# Patient Record
Sex: MALE | Race: BLACK OR AFRICAN AMERICAN | Employment: PART TIME | URBAN - METROPOLITAN AREA
[De-identification: names, ages, dates, MRNs, and addresses within clinical notes are randomized per-mention and may not be internally consistent; named-entity substitution may affect disease eponyms.]

---

## 2022-06-02 PROBLEM — G47.30 SLEEP APNEA: Status: ACTIVE | Noted: 2022-06-02

## 2022-06-02 PROBLEM — M72.2 PLANTAR FASCIAL FIBROMATOSIS: Status: ACTIVE | Noted: 2022-06-02

## 2022-06-02 PROBLEM — M17.9 OSTEOARTHRITIS OF KNEE: Status: ACTIVE | Noted: 2022-06-02

## 2022-06-02 RX ORDER — CETIRIZINE HCL 10 MG
TABLET ORAL DAILY
COMMUNITY
End: 2022-06-21

## 2022-06-02 RX ORDER — FLUTICASONE FUROATE, UMECLIDINIUM BROMIDE AND VILANTEROL TRIFENATATE 100; 62.5; 25 UG/1; UG/1; UG/1
1 POWDER RESPIRATORY (INHALATION) DAILY
COMMUNITY
End: 2022-06-21

## 2022-06-02 RX ORDER — PANTOPRAZOLE SODIUM 40 MG/1
40 TABLET, DELAYED RELEASE ORAL DAILY
COMMUNITY
End: 2022-06-21

## 2022-06-02 RX ORDER — ONDANSETRON 4 MG/1
4 TABLET, ORALLY DISINTEGRATING ORAL
COMMUNITY
End: 2022-06-21

## 2022-06-02 RX ORDER — SILDENAFIL 50 MG/1
TABLET, FILM COATED ORAL AS NEEDED
COMMUNITY

## 2022-06-02 RX ORDER — TAMSULOSIN HYDROCHLORIDE 0.4 MG/1
0.4 CAPSULE ORAL DAILY
COMMUNITY
Start: 2022-05-02

## 2022-06-08 ENCOUNTER — OFFICE VISIT (OUTPATIENT)
Dept: GASTROENTEROLOGY | Age: 77
End: 2022-06-08
Payer: MEDICARE

## 2022-06-08 VITALS
BODY MASS INDEX: 29.32 KG/M2 | HEIGHT: 66 IN | WEIGHT: 182.4 LBS | DIASTOLIC BLOOD PRESSURE: 84 MMHG | TEMPERATURE: 97 F | HEART RATE: 79 BPM | SYSTOLIC BLOOD PRESSURE: 129 MMHG | OXYGEN SATURATION: 98 % | RESPIRATION RATE: 18 BRPM

## 2022-06-08 VITALS — BODY MASS INDEX: 27.89 KG/M2 | WEIGHT: 184 LBS | HEIGHT: 68 IN

## 2022-06-08 VITALS — HEIGHT: 68 IN

## 2022-06-08 DIAGNOSIS — K27.9 PEPTIC ULCER: ICD-10-CM

## 2022-06-08 DIAGNOSIS — K57.30 DIVERTICULAR DISEASE OF COLON: ICD-10-CM

## 2022-06-08 DIAGNOSIS — K92.0 GASTROINTESTINAL HEMORRHAGE WITH HEMATEMESIS: ICD-10-CM

## 2022-06-08 DIAGNOSIS — K21.00 GASTROESOPHAGEAL REFLUX DISEASE WITH ESOPHAGITIS WITHOUT HEMORRHAGE: ICD-10-CM

## 2022-06-08 DIAGNOSIS — K27.9 PEPTIC ULCER: Primary | ICD-10-CM

## 2022-06-08 PROCEDURE — 1101F PT FALLS ASSESS-DOCD LE1/YR: CPT | Performed by: INTERNAL MEDICINE

## 2022-06-08 PROCEDURE — 99204 OFFICE O/P NEW MOD 45 MIN: CPT | Performed by: INTERNAL MEDICINE

## 2022-06-08 PROCEDURE — G8427 DOCREV CUR MEDS BY ELIG CLIN: HCPCS | Performed by: INTERNAL MEDICINE

## 2022-06-08 PROCEDURE — G8417 CALC BMI ABV UP PARAM F/U: HCPCS | Performed by: INTERNAL MEDICINE

## 2022-06-08 PROCEDURE — 3017F COLORECTAL CA SCREEN DOC REV: CPT | Performed by: INTERNAL MEDICINE

## 2022-06-08 PROCEDURE — 1123F ACP DISCUSS/DSCN MKR DOCD: CPT | Performed by: INTERNAL MEDICINE

## 2022-06-08 PROCEDURE — G8536 NO DOC ELDER MAL SCRN: HCPCS | Performed by: INTERNAL MEDICINE

## 2022-06-08 PROCEDURE — G8510 SCR DEP NEG, NO PLAN REQD: HCPCS | Performed by: INTERNAL MEDICINE

## 2022-06-08 RX ORDER — TOLTERODINE 4 MG/1
4 CAPSULE, EXTENDED RELEASE ORAL DAILY
COMMUNITY
End: 2022-06-21

## 2022-06-08 RX ORDER — FESOTERODINE FUMARATE 8 MG/1
TABLET, FILM COATED, EXTENDED RELEASE ORAL DAILY
COMMUNITY
End: 2022-06-21

## 2022-06-08 RX ORDER — OXYCODONE AND ACETAMINOPHEN 5; 325 MG/1; MG/1
TABLET ORAL
COMMUNITY

## 2022-06-08 RX ORDER — IVERMECTIN 3 MG/1
TABLET ORAL ONCE
COMMUNITY
End: 2022-06-21

## 2022-06-08 RX ORDER — POLYETHYLENE GLYCOL 3350 17 G/17G
POWDER, FOR SOLUTION ORAL
Qty: 510 G | Refills: 0 | Status: SHIPPED | OUTPATIENT
Start: 2022-06-08 | End: 2022-06-21

## 2022-06-08 NOTE — PROGRESS NOTES
Chief Complaint   Patient presents with    New Patient     1. Have you been to the ER, urgent care clinic since your last visit? Hospitalized since your last visit? No    2. Have you seen or consulted any other health care providers outside of the [address] [place] [address] since your last visit? Include any pap smears or colon screening. No   Visit Vitals  /84 (BP 1 Location: Right arm, BP Patient Position: Sitting, BP Cuff Size: Adult)   Pulse 79   Temp 97 °F (36.1 °C) (Oral)   Resp 18   Ht 5' 6\" (1.676 m)   Wt 82.7 kg (182 lb 6.4 oz)   SpO2 98%   BMI 29.44 kg/m²     Patient seen in office by Dr Vicki Reyes, EGD and colonoscopy ordered. Patient chose June 21, 2022. Patient to arrive at 10 am for 11 am procedure. Instructions given, meds sent to pharmacy. Patient has [employer] and per [employer] no pre-auth required ref # [id number].

## 2022-06-10 NOTE — H&P (VIEW-ONLY)
Brittany Menjivar is a 77 y.o. male who presents today for the following:  Chief Complaint   Patient presents with    New Patient         Allergies   Allergen Reactions    Sulfa (Sulfonamide Antibiotics) Unknown (comments)       Current Outpatient Medications   Medication Sig    tolterodine ER (DETROL LA) 4 mg ER capsule Take 4 mg by mouth daily.  ivermectin (STROMECTOL) 3 mg tablet Take  by mouth once.  fesoterodine (Toviaz) 8 mg ER tablet Take  by mouth daily.  oxyCODONE-acetaminophen (PERCOCET) 5-325 mg per tablet Take  by mouth every four (4) hours as needed for Pain.  polyethylene glycol (MIRALAX) 17 gram/dose powder Use as directed  Indications: emptying of the bowel    tamsulosin (FLOMAX) 0.4 mg capsule Take 0.4 mg by mouth daily.  sildenafil citrate (VIAGRA) 50 mg tablet Take  by mouth as needed for Erectile Dysfunction.  pantoprazole (PROTONIX) 40 mg tablet Take 40 mg by mouth daily.  mirabegron ER (Myrbetriq) 50 mg ER tablet Take 50 mg by mouth daily.  cetirizine (ZYRTEC) 10 mg tablet Take  by mouth daily.  fluticasone-umeclidinium-vilanterol (Trelegy Ellipta) 100-62.5-25 mcg inhaler Take 1 Puff by inhalation daily. (Patient not taking: Reported on 6/8/2022)    ondansetron (ZOFRAN ODT) 4 mg disintegrating tablet Take 4 mg by mouth every eight (8) hours as needed for Nausea or Vomiting. (Patient not taking: Reported on 6/8/2022)     No current facility-administered medications for this visit.        Past Medical History:   Diagnosis Date    Heart disease     History of bleeding ulcers 04/04/2021    Hyperlipidemia     Kidney stones     Osteoarthritis of knee 6/2/2022    Peptic ulcer 2016    Plantar fascial fibromatosis 6/2/2022    Sleep apnea 6/2/2022       Past Surgical History:   Procedure Laterality Date    COLONOSCOPY  04/13/2016    repeat in 10 years     COLONOSCOPY,DIAGNOSTIC  2011    HX KNEE ARTHROSCOPY Left 10/2016    [place] EGD DELIVER THERMAL ENERGY SPHNCTR/CARDIA GERD  04/13/2016       Family History   Problem Relation Age of Onset    Kidney Disease Mother     Cancer Father     Pancreatic Cancer Father        Social History     Socioeconomic History    Marital status:      Spouse name: Not on file    Number of children: Not on file    Years of education: Not on file    Highest education level: Not on file   Occupational History    Not on file   Tobacco Use    Smoking status: Never Smoker    Smokeless tobacco: Never Used   Vaping Use    Vaping Use: Never used   Substance and Sexual Activity    Alcohol use: Yes     Alcohol/week: 1.0 standard drink     Types: 1 Shots of liquor per week     Comment: daily     Drug use: Never    Sexual activity: Not on file   Other Topics Concern    Not on file   Social History Narrative    Not on file     Social Determinants of Health     Financial Resource Strain:     Difficulty of Paying Living Expenses: Not on file   Food Insecurity:     Worried About Running Out of Food in the Last Year: Not on file    Pedro of Food in the Last Year: Not on file   Transportation Needs:     Lack of Transportation (Medical): Not on file    Lack of Transportation (Non-Medical):  Not on file   Physical Activity:     Days of Exercise per Week: Not on file    Minutes of Exercise per Session: Not on file   Stress:     Feeling of Stress : Not on file   Social Connections:     Frequency of Communication with Friends and Family: Not on file    Frequency of Social Gatherings with Friends and Family: Not on file    Attends [employer] Services: Not on file    Active Member of Clubs or Organizations: Not on file    Attends Club or Organization Meetings: Not on file    Marital Status: Not on file   Intimate Partner Violence:     Fear of Current or Ex-Partner: Not on file    Emotionally Abused: Not on file    Physically Abused: Not on file    Sexually Abused: Not on file   Housing Stability:     Unable to Pay for Housing in the Last Year: Not on file    Number of Places Lived in the Last Year: Not on file    Unstable Housing in the Last Year: Not on file         HPI  71-year-old male with history of peptic ulcer disease, sleep apnea, osteoarthritis, hyperlipidemia, and BPH who comes in for evaluation of his peptic ulcer disease history. Patient states 1-1 and half years ago after working in his yard he became diaphoretic and nauseated which led to an episode where he vomited blood. He went to [facility] where he was seen and discharged on pantoprazole. Patient states he still has increased GI noise she states it is constant. He does take a stool softener on a regular basis. He is eating well and has good appetite. Bowel movements are change back to brown but he now just has constipation. No gross GI bleeding recently. No black or tarry stools recently. Stable weight. He states his last colonoscopy was in 2016 by Dr. Ankit Duenas and was told he should have a repeated in 5 years. Review of Systems   Constitutional: Negative. HENT: Negative. Negative for nosebleeds. Eyes: Negative. Respiratory: Negative. Cardiovascular: Negative. Gastrointestinal: Positive for abdominal pain, blood in stool, constipation and heartburn. Negative for diarrhea, melena, nausea and vomiting. Genitourinary: Negative. Musculoskeletal: Positive for joint pain. Skin: Negative. Neurological: Negative. Endo/Heme/Allergies: Negative. Psychiatric/Behavioral: Negative. All other systems reviewed and are negative. Visit Vitals  /84 (BP 1 Location: Right arm, BP Patient Position: Sitting, BP Cuff Size: Adult)   Pulse 79   Temp 97 °F (36.1 °C) (Oral)   Resp 18   Ht 5' 6\" (1.676 m)   Wt 82.7 kg (182 lb 6.4 oz)   SpO2 98%   BMI 29.44 kg/m²     Physical Exam  Vitals and nursing note reviewed. Constitutional:       Appearance: Normal appearance. HENT:      Head: Normocephalic and atraumatic.       Nose: Nose normal.      Mouth/Throat:      Mouth: Mucous membranes are moist.      Pharynx: Oropharynx is clear. Eyes:      General: No scleral icterus. Extraocular Movements: Extraocular movements intact. Conjunctiva/sclera: Conjunctivae normal.      Pupils: Pupils are equal, round, and reactive to light. Cardiovascular:      Rate and Rhythm: Normal rate and regular rhythm. Heart sounds: Normal heart sounds. Pulmonary:      Effort: Pulmonary effort is normal.      Breath sounds: Normal breath sounds. Abdominal:      General: Bowel sounds are normal. There is no distension. Palpations: Abdomen is soft. There is no mass. Tenderness: There is abdominal tenderness. There is no right CVA tenderness, left CVA tenderness, guarding or rebound. Hernia: No hernia is present. Musculoskeletal:         General: Normal range of motion. Cervical back: Normal range of motion and neck supple. Skin:     General: Skin is warm and dry. Coloration: Skin is not jaundiced. Neurological:      General: No focal deficit present. Mental Status: He is alert and oriented to person, place, and time. Psychiatric:         Mood and Affect: Mood normal.         Behavior: Behavior normal.         Thought Content: Thought content normal.         Judgment: Judgment normal.            1. Peptic ulcer  We will schedule patient for an EGD to further evaluate. Patient should continue the pantoprazole 40 mg daily for now. - UPPER GI ENDOSCOPY,DIAGNOSIS; Future    2. Gastrointestinal hemorrhage with hematemesis  No recent blood loss noted  - COLONOSCOPY,DIAGNOSTIC; Future  - polyethylene glycol (MIRALAX) 17 gram/dose powder; Use as directed  Indications: emptying of the bowel  Dispense: 510 g; Refill: 0  - UPPER GI ENDOSCOPY,DIAGNOSIS; Future    3. Gastroesophageal reflux disease with esophagitis without hemorrhage      4.  Diverticular disease of colon

## 2022-06-10 NOTE — PROGRESS NOTES
Mar Hensley is a 77 y.o. male who presents today for the following:  Chief Complaint   Patient presents with    New Patient         Allergies   Allergen Reactions    Sulfa (Sulfonamide Antibiotics) Unknown (comments)       Current Outpatient Medications   Medication Sig    tolterodine ER (DETROL LA) 4 mg ER capsule Take 4 mg by mouth daily.  ivermectin (STROMECTOL) 3 mg tablet Take  by mouth once.  fesoterodine (Toviaz) 8 mg ER tablet Take  by mouth daily.  oxyCODONE-acetaminophen (PERCOCET) 5-325 mg per tablet Take  by mouth every four (4) hours as needed for Pain.  polyethylene glycol (MIRALAX) 17 gram/dose powder Use as directed  Indications: emptying of the bowel    tamsulosin (FLOMAX) 0.4 mg capsule Take 0.4 mg by mouth daily.  sildenafil citrate (VIAGRA) 50 mg tablet Take  by mouth as needed for Erectile Dysfunction.  pantoprazole (PROTONIX) 40 mg tablet Take 40 mg by mouth daily.  mirabegron ER (Myrbetriq) 50 mg ER tablet Take 50 mg by mouth daily.  cetirizine (ZYRTEC) 10 mg tablet Take  by mouth daily.  fluticasone-umeclidinium-vilanterol (Trelegy Ellipta) 100-62.5-25 mcg inhaler Take 1 Puff by inhalation daily. (Patient not taking: Reported on 6/8/2022)    ondansetron (ZOFRAN ODT) 4 mg disintegrating tablet Take 4 mg by mouth every eight (8) hours as needed for Nausea or Vomiting. (Patient not taking: Reported on 6/8/2022)     No current facility-administered medications for this visit.        Past Medical History:   Diagnosis Date    Heart disease     History of bleeding ulcers 04/04/2021    Hyperlipidemia     Kidney stones     Osteoarthritis of knee 6/2/2022    Peptic ulcer 2016    Plantar fascial fibromatosis 6/2/2022    Sleep apnea 6/2/2022       Past Surgical History:   Procedure Laterality Date    COLONOSCOPY  04/13/2016    repeat in 10 years     COLONOSCOPY,DIAGNOSTIC  2011    HX KNEE ARTHROSCOPY Left 10/2016    [place] EGD DELIVER THERMAL ENERGY SPHNCTR/CARDIA GERD  04/13/2016       Family History   Problem Relation Age of Onset    Kidney Disease Mother     Cancer Father     Pancreatic Cancer Father        Social History     Socioeconomic History    Marital status:      Spouse name: Not on file    Number of children: Not on file    Years of education: Not on file    Highest education level: Not on file   Occupational History    Not on file   Tobacco Use    Smoking status: Never Smoker    Smokeless tobacco: Never Used   Vaping Use    Vaping Use: Never used   Substance and Sexual Activity    Alcohol use: Yes     Alcohol/week: 1.0 standard drink     Types: 1 Shots of liquor per week     Comment: daily     Drug use: Never    Sexual activity: Not on file   Other Topics Concern    Not on file   Social History Narrative    Not on file     Social Determinants of Health     Financial Resource Strain:     Difficulty of Paying Living Expenses: Not on file   Food Insecurity:     Worried About Running Out of Food in the Last Year: Not on file    Pedro of Food in the Last Year: Not on file   Transportation Needs:     Lack of Transportation (Medical): Not on file    Lack of Transportation (Non-Medical):  Not on file   Physical Activity:     Days of Exercise per Week: Not on file    Minutes of Exercise per Session: Not on file   Stress:     Feeling of Stress : Not on file   Social Connections:     Frequency of Communication with Friends and Family: Not on file    Frequency of Social Gatherings with Friends and Family: Not on file    Attends [employer] Services: Not on file    Active Member of Clubs or Organizations: Not on file    Attends Club or Organization Meetings: Not on file    Marital Status: Not on file   Intimate Partner Violence:     Fear of Current or Ex-Partner: Not on file    Emotionally Abused: Not on file    Physically Abused: Not on file    Sexually Abused: Not on file   Housing Stability:     Unable to Pay for Housing in the Last Year: Not on file    Number of Places Lived in the Last Year: Not on file    Unstable Housing in the Last Year: Not on file         HPI  67-year-old male with history of peptic ulcer disease, sleep apnea, osteoarthritis, hyperlipidemia, and BPH who comes in for evaluation of his peptic ulcer disease history. Patient states 1-1 and half years ago after working in his yard he became diaphoretic and nauseated which led to an episode where he vomited blood. He went to [facility] where he was seen and discharged on pantoprazole. Patient states he still has increased GI noise she states it is constant. He does take a stool softener on a regular basis. He is eating well and has good appetite. Bowel movements are change back to brown but he now just has constipation. No gross GI bleeding recently. No black or tarry stools recently. Stable weight. He states his last colonoscopy was in 2016 by Dr. Gary Blair and was told he should have a repeated in 5 years. Review of Systems   Constitutional: Negative. HENT: Negative. Negative for nosebleeds. Eyes: Negative. Respiratory: Negative. Cardiovascular: Negative. Gastrointestinal: Positive for abdominal pain, blood in stool, constipation and heartburn. Negative for diarrhea, melena, nausea and vomiting. Genitourinary: Negative. Musculoskeletal: Positive for joint pain. Skin: Negative. Neurological: Negative. Endo/Heme/Allergies: Negative. Psychiatric/Behavioral: Negative. All other systems reviewed and are negative. Visit Vitals  /84 (BP 1 Location: Right arm, BP Patient Position: Sitting, BP Cuff Size: Adult)   Pulse 79   Temp 97 °F (36.1 °C) (Oral)   Resp 18   Ht 5' 6\" (1.676 m)   Wt 82.7 kg (182 lb 6.4 oz)   SpO2 98%   BMI 29.44 kg/m²     Physical Exam  Vitals and nursing note reviewed. Constitutional:       Appearance: Normal appearance. HENT:      Head: Normocephalic and atraumatic.       Nose: Nose normal.      Mouth/Throat:      Mouth: Mucous membranes are moist.      Pharynx: Oropharynx is clear. Eyes:      General: No scleral icterus. Extraocular Movements: Extraocular movements intact. Conjunctiva/sclera: Conjunctivae normal.      Pupils: Pupils are equal, round, and reactive to light. Cardiovascular:      Rate and Rhythm: Normal rate and regular rhythm. Heart sounds: Normal heart sounds. Pulmonary:      Effort: Pulmonary effort is normal.      Breath sounds: Normal breath sounds. Abdominal:      General: Bowel sounds are normal. There is no distension. Palpations: Abdomen is soft. There is no mass. Tenderness: There is abdominal tenderness. There is no right CVA tenderness, left CVA tenderness, guarding or rebound. Hernia: No hernia is present. Musculoskeletal:         General: Normal range of motion. Cervical back: Normal range of motion and neck supple. Skin:     General: Skin is warm and dry. Coloration: Skin is not jaundiced. Neurological:      General: No focal deficit present. Mental Status: He is alert and oriented to person, place, and time. Psychiatric:         Mood and Affect: Mood normal.         Behavior: Behavior normal.         Thought Content: Thought content normal.         Judgment: Judgment normal.            1. Peptic ulcer  We will schedule patient for an EGD to further evaluate. Patient should continue the pantoprazole 40 mg daily for now. - UPPER GI ENDOSCOPY,DIAGNOSIS; Future    2. Gastrointestinal hemorrhage with hematemesis  No recent blood loss noted  - COLONOSCOPY,DIAGNOSTIC; Future  - polyethylene glycol (MIRALAX) 17 gram/dose powder; Use as directed  Indications: emptying of the bowel  Dispense: 510 g; Refill: 0  - UPPER GI ENDOSCOPY,DIAGNOSIS; Future    3. Gastroesophageal reflux disease with esophagitis without hemorrhage      4.  Diverticular disease of colon

## 2022-06-21 ENCOUNTER — ANESTHESIA EVENT (OUTPATIENT)
Dept: ENDOSCOPY | Age: 77
End: 2022-06-21
Payer: MEDICARE

## 2022-06-21 ENCOUNTER — ANESTHESIA (OUTPATIENT)
Dept: ENDOSCOPY | Age: 77
End: 2022-06-21
Payer: MEDICARE

## 2022-06-21 ENCOUNTER — HOSPITAL ENCOUNTER (OUTPATIENT)
Age: 77
Setting detail: OUTPATIENT SURGERY
Discharge: HOME | End: 2022-06-21
Attending: INTERNAL MEDICINE | Admitting: INTERNAL MEDICINE
Payer: MEDICARE

## 2022-06-21 VITALS
DIASTOLIC BLOOD PRESSURE: 84 MMHG | OXYGEN SATURATION: 98 % | HEIGHT: 66 IN | HEART RATE: 68 BPM | SYSTOLIC BLOOD PRESSURE: 152 MMHG | RESPIRATION RATE: 18 BRPM | WEIGHT: 180 LBS | TEMPERATURE: 98.5 F | BODY MASS INDEX: 28.93 KG/M2

## 2022-06-21 DIAGNOSIS — K29.50 CHRONIC GASTRITIS WITHOUT BLEEDING, UNSPECIFIED GASTRITIS TYPE: Primary | ICD-10-CM

## 2022-06-21 PROCEDURE — 74011000258 HC RX REV CODE- 258: Performed by: NURSE ANESTHETIST, CERTIFIED REGISTERED

## 2022-06-21 PROCEDURE — 45378 DIAGNOSTIC COLONOSCOPY: CPT | Performed by: INTERNAL MEDICINE

## 2022-06-21 PROCEDURE — 43239 EGD BIOPSY SINGLE/MULTIPLE: CPT | Performed by: INTERNAL MEDICINE

## 2022-06-21 PROCEDURE — 74011000250 HC RX REV CODE- 250

## 2022-06-21 PROCEDURE — 88312 SPECIAL STAINS GROUP 1: CPT

## 2022-06-21 PROCEDURE — 74011250636 HC RX REV CODE- 250/636: Performed by: NURSE ANESTHETIST, CERTIFIED REGISTERED

## 2022-06-21 PROCEDURE — 76060000031 HC ANESTHESIA FIRST 0.5 HR: Performed by: INTERNAL MEDICINE

## 2022-06-21 PROCEDURE — 74011250636 HC RX REV CODE- 250/636

## 2022-06-21 PROCEDURE — 2709999900 HC NON-CHARGEABLE SUPPLY: Performed by: INTERNAL MEDICINE

## 2022-06-21 PROCEDURE — 76040000019: Performed by: INTERNAL MEDICINE

## 2022-06-21 PROCEDURE — 77030021593 HC FCPS BIOP ENDOSC BSC -A: Performed by: INTERNAL MEDICINE

## 2022-06-21 PROCEDURE — 74011250636 HC RX REV CODE- 250/636: Performed by: INTERNAL MEDICINE

## 2022-06-21 PROCEDURE — 74011000250 HC RX REV CODE- 250: Performed by: NURSE ANESTHETIST, CERTIFIED REGISTERED

## 2022-06-21 PROCEDURE — 88305 TISSUE EXAM BY PATHOLOGIST: CPT

## 2022-06-21 RX ORDER — SODIUM CHLORIDE 9 MG/ML
125 INJECTION, SOLUTION INTRAVENOUS CONTINUOUS
Status: DISCONTINUED | OUTPATIENT
Start: 2022-06-21 | End: 2022-06-21 | Stop reason: HOSPADM

## 2022-06-21 RX ORDER — SODIUM CHLORIDE 0.9 % (FLUSH) 0.9 %
5-40 SYRINGE (ML) INJECTION AS NEEDED
Status: DISCONTINUED | OUTPATIENT
Start: 2022-06-21 | End: 2022-06-21 | Stop reason: HOSPADM

## 2022-06-21 RX ORDER — SODIUM CHLORIDE 0.9 % (FLUSH) 0.9 %
5-40 SYRINGE (ML) INJECTION EVERY 8 HOURS
Status: DISCONTINUED | OUTPATIENT
Start: 2022-06-21 | End: 2022-06-21 | Stop reason: HOSPADM

## 2022-06-21 RX ORDER — GLYCOPYRROLATE 0.2 MG/ML
INJECTION INTRAMUSCULAR; INTRAVENOUS AS NEEDED
Status: DISCONTINUED | OUTPATIENT
Start: 2022-06-21 | End: 2022-06-21 | Stop reason: HOSPADM

## 2022-06-21 RX ORDER — FAMOTIDINE 40 MG/1
40 TABLET, FILM COATED ORAL DAILY
Qty: 30 TABLET | Refills: 3 | Status: SHIPPED | OUTPATIENT
Start: 2022-06-21

## 2022-06-21 RX ORDER — PROPOFOL 10 MG/ML
INJECTION, EMULSION INTRAVENOUS AS NEEDED
Status: DISCONTINUED | OUTPATIENT
Start: 2022-06-21 | End: 2022-06-21 | Stop reason: HOSPADM

## 2022-06-21 RX ORDER — SODIUM CHLORIDE 9 MG/ML
INJECTION, SOLUTION INTRAVENOUS
Status: DISCONTINUED | OUTPATIENT
Start: 2022-06-21 | End: 2022-06-21 | Stop reason: HOSPADM

## 2022-06-21 RX ADMIN — SODIUM CHLORIDE: 9 INJECTION, SOLUTION INTRAVENOUS at 11:48

## 2022-06-21 RX ADMIN — GLYCOPYRROLATE 0.2 MG: 0.2 INJECTION INTRAMUSCULAR; INTRAVENOUS at 11:48

## 2022-06-21 RX ADMIN — PROPOFOL 50 MG: 10 INJECTION, EMULSION INTRAVENOUS at 12:05

## 2022-06-21 RX ADMIN — PROPOFOL 50 MG: 10 INJECTION, EMULSION INTRAVENOUS at 12:09

## 2022-06-21 RX ADMIN — TOPICAL ANESTHETIC 1 SPRAY: 200 SPRAY DENTAL; PERIODONTAL at 11:48

## 2022-06-21 RX ADMIN — PROPOFOL 50 MG: 10 INJECTION, EMULSION INTRAVENOUS at 11:59

## 2022-06-21 NOTE — INTERVAL H&P NOTE
Update History & Physical    The Patient's History and Physical of June 21, 2022,  was reviewed with the patient and I examined the patient. There was no change. The surgical site was confirmed by the patient and me. Plan:  The risk, benefits, expected outcome, and alternative to the recommended procedure have been discussed with the patient. Patient understands and wants to proceed with the procedure.     Electronically signed by Shakir Bautista MD on 6/21/2022 at 10:23 AM

## 2022-06-21 NOTE — OP NOTES
EGD Procedure Note        Patient: Christina Landaverde MRN: [id number]  SSN: [id number]    YOB: 1945  Age: 77 y.o. Sex: male        Date/Time:  6/21/2022 12:24 PM         IMPRESSION:       1. Antral gastritis with superficial ulceration  2. Distal esophagitis (grade 1)       RECOMMENDATIONS:    1. Check biopsy results. 2. We will start patient on famotidine 40 mg daily. Procedure: Esophagogastroduodenoscopy with cold biopsies    Indication: GI bleeding    Endoscopist:  Abelino Segura MD    Referring Provider:   Pam Parks MD    History: The history and physical exam were reviewed and updated. Endoscope: GIF H180 Olympus video endoscope    Extent of Exam: Second part of the duodenum    ASA: Grade 2    Anethesia/Sedation:  TIVA    Description of the procedure: The procedure was discussed with the patient including risks, benefits, alternatives including risks of iv sedation, bleeding, perforation and aspiration. A safety timeout was performed. The patient was placed in the left lateral decubitus position. A bite block was placed. The patient was using standard protocol. The patients vital signs were monitored at all times including heart rate/rhythm, blood pressure and oxygen saturation. The endoscope was then passed under direct visualization to the second part of the duodenum. The endoscope was then slowly withdrawn while visualizing the mucosa. In the stomach a retroflexion was performed and gastric fundus and cardia visualized. The patient was then transferred to recovery in stable condition. Findings:   Esophagus: The esophageal mucosa was normal inflamed in the distal esophagus. .  Stomach: The gastric mucosa was inflamed throughout the gastric antrum with superficial ulcerations in the prepyloric region. Multiple biopsies were taken in the gastric antrum of this finding. .   Duodenum: The duodenum mucosa was normal with no ulceration, mass, stricture and no evidence of villous atrophy. Therapies:  None    Specimens:   ID Type Source Tests Collected by Time Destination   1 :  Preservative Stomach, Antrum  Tory Valdivia MD 6/21/2022 1202 Pathology     ASSISTANT:Circ-1: Suzette Velasquez  Scrub Tech-1: Molli Cogan           EBL:Minimal    Complications:   None; patient tolerated the procedure well.      Implants: None    Discharge disposition:  Out of the recovery area when discharge criteria met         Norm Shankar MD  June 21, 2022  12:24 PM

## 2022-06-21 NOTE — ANESTHESIA PREPROCEDURE EVALUATION
Relevant Problems   RESPIRATORY SYSTEM   (+) Sleep apnea      GASTROINTESTINAL   (+) Peptic ulcer       Anesthetic History   No history of anesthetic complications  Other anesthesia complications          Review of Systems / Medical History  Patient summary reviewed, nursing notes reviewed and pertinent labs reviewed    Pulmonary        Sleep apnea           Neuro/Psych   Within defined limits           Cardiovascular  Within defined limits                     GI/Hepatic/Renal           PUD     Endo/Other        Arthritis     Other Findings              Physical Exam    Airway  Mallampati: II  TM Distance: 4 - 6 cm  Neck ROM: normal range of motion        Cardiovascular    Rhythm: regular  Rate: normal         Dental  No notable dental hx       Pulmonary  Breath sounds clear to auscultation               Abdominal  Abdominal exam normal       Other Findings            Anesthetic Plan    ASA: 3  Anesthesia type: total IV anesthesia            Anesthetic plan and risks discussed with: Patient

## 2022-06-21 NOTE — DISCHARGE INSTRUCTIONS

## 2022-06-21 NOTE — ANESTHESIA POSTPROCEDURE EVALUATION
Procedure(s):  COLONOSCOPY, ESOPHAGOGASTRODUODENOSCOPY (EGD) (TIVA)  ESOPHAGOGASTRODUODENOSCOPY (EGD).     total IV anesthesia    Anesthesia Post Evaluation      Multimodal analgesia: multimodal analgesia not used between 6 hours prior to anesthesia start to PACU discharge  Patient location during evaluation: PACU  Patient participation: complete - patient participated  Pain management: adequate  Airway patency: patent  Anesthetic complications: no  Cardiovascular status: acceptable and stable  Respiratory status: acceptable and room air  Hydration status: acceptable  Post anesthesia nausea and vomiting:  none  Final Post Anesthesia Temperature Assessment:  Normothermia (36.0-37.5 degrees C)      INITIAL Post-op Vital signs:   Vitals Value Taken Time   /75 06/21/22 1219   Temp 36.9 °C (98.5 °F) 06/21/22 1219   Pulse 87 06/21/22 1219   Resp 87 06/21/22 1219   SpO2 97 % 06/21/22 1219

## 2022-06-21 NOTE — OP NOTES
Colonoscopy Procedure Note      Patient: Guy Pandya MRN: [id number]  SSN: [id number]    YOB: 1945  Age: 77 y.o. Sex: male      Date of Procedure: 6/21/2022  Date/Time:  6/21/2022 12:19 PM       IMPRESSION:     1. Left-sided diverticulosis         RECOMMENDATIONS:     1) Increase fiber in diet to keep stools soft. 2) Repeat colonoscopy in 5 years. INDICATION: GI bleeding     PROCEDURE PERFORMED: Colonoscopy     DESCRIPTION OF PROCEDURE: An informed consent was obtained. The patient was placed in left lateral position. Perianal inspection and a digital rectal exam was performed. Video colonoscope was introduced into the rectum and advanced under direct vision up to the terminal ileum. With adequate insufflation and maneuvering of the withdrawing scope, the colonic mucosa was visualized carefully. Retroflexion was performed in the rectum to see the anorectum and also in the ascending colon to look behind the folds. Vital signs, pulse oximetry, single lead cardiac monitor were monitored throughout the procedure as the sedation was titrated to the desired effect ensuring patient comfort and safety. The patient tolerated the procedure very well and was transferred to the recovery area. Following is the summary of findings: A number of diverticula were noted throughout the descending and sigmoid colon. No other mucosal lesions were noted to the level of the cecum.        ENDOSCOPIST: Marisa Nixon MD      ENDOSCOPE: Olympus videocolonoscope     ASSISTANT:Circ-1: Stanislaw Mcdaniel              Scrub Tech-1: Courtney Dakins     ANESTHESIA: TIVA      QUALITY OF PREPARATION: Good      FINDINGS:   1. Left-sided diverticulosis       Complications: None     EBL: None     SPECIMENS:   ID Type Source Tests Collected by Time Destination   1 :  Preservative Stomach, Antrum  Rosalind Woods MD 6/21/2022 1202 Pathology             Marisa Nixon MD  June 21, 2022  12:19 PM

## 2022-06-26 NOTE — PROGRESS NOTES
Tell patient that the biopsies taken in his work showed gastritis/inflammation. No infection was noted. Continue the famotidine 40 mg daily. He should have a repeat colonoscopy in 5 years.

## 2022-06-28 ENCOUNTER — TELEPHONE (OUTPATIENT)
Dept: GASTROENTEROLOGY | Age: 77
End: 2022-06-28

## 2022-06-28 NOTE — TELEPHONE ENCOUNTER
Patient notified that the biopsies taken in his work showed gastritis/inflammation.  No infection was noted.  Continue the famotidine 40 mg daily. Maryjane Daniels should have a repeat colonoscopy in 5 years.

## 2023-01-17 DIAGNOSIS — K29.50 CHRONIC GASTRITIS WITHOUT BLEEDING, UNSPECIFIED GASTRITIS TYPE: ICD-10-CM

## 2023-01-27 RX ORDER — FAMOTIDINE 40 MG/1
TABLET, FILM COATED ORAL
Qty: 30 TABLET | Refills: 6 | Status: SHIPPED | OUTPATIENT
Start: 2023-01-27

## 2023-07-17 ENCOUNTER — OFFICE VISIT (OUTPATIENT)
Age: 78
End: 2023-07-17
Payer: MEDICARE

## 2023-07-17 VITALS
RESPIRATION RATE: 14 BRPM | DIASTOLIC BLOOD PRESSURE: 80 MMHG | OXYGEN SATURATION: 97 % | TEMPERATURE: 97.3 F | BODY MASS INDEX: 29.06 KG/M2 | HEIGHT: 66 IN | WEIGHT: 180.8 LBS | HEART RATE: 62 BPM | SYSTOLIC BLOOD PRESSURE: 120 MMHG

## 2023-07-17 DIAGNOSIS — R14.0 BLOATING: ICD-10-CM

## 2023-07-17 DIAGNOSIS — K57.30 DIVERTICULAR DISEASE OF COLON: ICD-10-CM

## 2023-07-17 DIAGNOSIS — K29.30 CHRONIC SUPERFICIAL GASTRITIS WITHOUT BLEEDING: ICD-10-CM

## 2023-07-17 DIAGNOSIS — K21.00 GASTROESOPHAGEAL REFLUX DISEASE WITH ESOPHAGITIS WITHOUT HEMORRHAGE: Primary | ICD-10-CM

## 2023-07-17 PROCEDURE — 1123F ACP DISCUSS/DSCN MKR DOCD: CPT | Performed by: INTERNAL MEDICINE

## 2023-07-17 PROCEDURE — 99214 OFFICE O/P EST MOD 30 MIN: CPT | Performed by: INTERNAL MEDICINE

## 2023-07-17 RX ORDER — M-VIT,TX,IRON,MINS/CALC/FOLIC 27MG-0.4MG
1 TABLET ORAL DAILY
COMMUNITY

## 2023-07-17 ASSESSMENT — PATIENT HEALTH QUESTIONNAIRE - PHQ9
SUM OF ALL RESPONSES TO PHQ QUESTIONS 1-9: 0
1. LITTLE INTEREST OR PLEASURE IN DOING THINGS: 0
SUM OF ALL RESPONSES TO PHQ QUESTIONS 1-9: 0
SUM OF ALL RESPONSES TO PHQ9 QUESTIONS 1 & 2: 0
SUM OF ALL RESPONSES TO PHQ QUESTIONS 1-9: 0
2. FEELING DOWN, DEPRESSED OR HOPELESS: 0
SUM OF ALL RESPONSES TO PHQ QUESTIONS 1-9: 0

## 2023-07-23 PROBLEM — K29.30 CHRONIC SUPERFICIAL GASTRITIS WITHOUT BLEEDING: Status: ACTIVE | Noted: 2023-07-23

## 2023-07-23 ASSESSMENT — ENCOUNTER SYMPTOMS
BLOOD IN STOOL: 0
RECTAL PAIN: 0
ANAL BLEEDING: 0
VOMITING: 0
ABDOMINAL DISTENTION: 1
NAUSEA: 0
ABDOMINAL PAIN: 1
ALLERGIC/IMMUNOLOGIC NEGATIVE: 1
RESPIRATORY NEGATIVE: 1
DIARRHEA: 0
CONSTIPATION: 0

## 2023-08-06 DIAGNOSIS — K21.00 GASTROESOPHAGEAL REFLUX DISEASE WITH ESOPHAGITIS WITHOUT HEMORRHAGE: Primary | ICD-10-CM

## 2023-08-08 RX ORDER — FAMOTIDINE 40 MG/1
TABLET, FILM COATED ORAL
Qty: 90 TABLET | Refills: 3 | Status: SHIPPED | OUTPATIENT
Start: 2023-08-08

## 2024-07-24 DIAGNOSIS — K21.00 GASTROESOPHAGEAL REFLUX DISEASE WITH ESOPHAGITIS WITHOUT HEMORRHAGE: ICD-10-CM

## 2024-11-03 RX ORDER — FAMOTIDINE 40 MG/1
TABLET, FILM COATED ORAL
Qty: 90 TABLET | Refills: 3 | Status: SHIPPED | OUTPATIENT
Start: 2024-11-03

## (undated) DEVICE — GLOVE ORANGE PI 7 1/2   MSG9075

## (undated) DEVICE — FCPS RAD JAW 4 SC 240CM W/NDL --

## (undated) DEVICE — TUBING, SUCTION, 9/32" X 10', STRAIGHT: Brand: MEDLINE

## (undated) DEVICE — SOLUTION IRRIG 1000ML STRL H2O USP PLAS POUR BTL

## (undated) DEVICE — PAD,PREPPING,CUFFED,24X48,7",NONSTERILE: Brand: MEDLINE

## (undated) DEVICE — WASH CLOTH INCONT LO LINT PREM 12X13 IN LF DISP

## (undated) DEVICE — SPONGE GZ W4XL4IN COT 12 PLY TYP VII WVN C FLD DSGN

## (undated) DEVICE — 1200CC GUARDIAN II: Brand: GUARDIAN

## (undated) DEVICE — JELLY,LUBE,STERILE,FLIP TOP,TUBE,4-OZ: Brand: MEDLINE